# Patient Record
(demographics unavailable — no encounter records)

---

## 2024-10-17 NOTE — PLAN
[Cognitive and/or Behavior Therapy] : Cognitive and/or Behavior Therapy  [[facility]] : [facility]  [Supportive Therapy] : Supportive Therapy [Recommended Frequency of Visits: ____] : Recommended frequency of visits: [unfilled] [Return in ____ week(s)] : Return in [unfilled] week(s) [de-identified] : Patient reports periods of tearfulness and feelings of sadness about his aging and health process and about the future. He states that he does not want to end up in a nursing home like his mother did near the end. He admits to having some all or nothing thinking about his abilities and we worked on seeing the spectrum and focusing on the things he can do and the things that make him happy. He states that his granddaughter is a source of shin as she gets bigger and is more interactive. We spoke about memory issues and using some reminder methods like phone calendar and erase board. Encouraged him to continue to participate in some social activities like fire fighters and [email or web address]. We spoke about his reticence about getting hearing aid and how this may be affecting his desire to be in larger groups where hearing is harder. He continues to do some outdoor furniture making and reading. We spoke about using positive self-talk and reminding himself of things he can do and things he still enjoys.

## 2024-10-17 NOTE — PHYSICAL EXAM
[Cooperative] : cooperative [Depressed] : depressed [Anxious] : anxious [Full] : full [Clear] : clear [Linear/Goal Directed] : linear/goal directed [None] : none [None Reported] : none reported [Average] : average [WNL] : within normal limits

## 2024-10-22 NOTE — PHYSICAL EXAM
[None] : none [Cooperative] : cooperative [Anxious] : anxious [Flat] : flat [Clear] : clear [Linear/Goal Directed] : linear/goal directed [Memory] : memory [Average] : average [WNL] : within normal limits [FreeTextEntry8] : better [de-identified] : better [de-identified] : short term memory impairment.

## 2024-10-22 NOTE — PLAN
[FreeTextEntry4] : Assessment: Patient is a 73 yo male with h/o RICARDO, seen today for medication management. Patient is compliant with the medications, tolerating it well without any side effects. I-STOP was checked without any problems.   PLAN: Labs: July 2023: HBA1C 8.8 ALT: 5.0 CT HEAD WITHOUT CONTRAST 11/1/2023: NORMAL. Continue Wellbutrin  mg PO QAM for depression, low motivation, energy, concentration and decrease SSRI induced sexual dysfunction. Continue Zoloft 150 mg for PO for depression and anxiety - Discussed risks and benefits of medications including side effects of GI and sexual with SSRI. Alternative strategies including no intervention discussed with patient. Patient consents to current medications as prescribed. - Discussed with patient regarding importance of abstinence and sobriety from alcohol and drugs. Educated about relationship between worsening mood/anxiety symptoms and drug use and improvement of symptoms with abstinence. - Next appointment was made by the patient in 3 months Patient was not in any distress.

## 2024-10-22 NOTE — HISTORY OF PRESENT ILLNESS
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    HPI: Patient accompanied by his wife Mirta.  Patient's wife explains that Endy had an MI on September 2022. 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 Patient has been feeling depressed since the heart attack and denies any depression prior to the heart attack.   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 Since the MI patients also c/o feeling light headedness intermittently in in the past but now seemms to have it everyday. His cardiologist discontinued one of his cardiac.meds. He has decreased quality of life, fear of the unknown decreased short term memory. He forgot how to use the computer and as a result of not being able to do what he wants to do he feels frustrated and irritable. His PCP put him on Prozac 20 mg x 20 years. Does not know if it is working for him.  Currently on Prozac 20 mg  Depression: has low mood but denies any , anhedonia.  Anxiety: endorses to anxiety in the form of fear of the unknown.  Sleep: 6 to 7 hours per night  Appetite is good. Weight: 194 lbs. Ht: 6'1  Energy, concentration, and motivation are all decreased. Denies any AVH, SI or HI.  Hypomanic symptoms: denies  Substance use hx:  Alcohol: started at age 16. Highest amt was 1 case of beer. Currently drinks socially: 5-6 beers. Last sribk was last week where he had 1 glass of wine.  +h/o blackouts but denies any DWI or withdrawal symptoms from alcohol.   Marijuana: started at age 19. Highest ant was 2-3  joints per day. Last use was Nov 1990.  Cocaine: experiemted with it Caffine: tea 3 cups per day.    [FreeTextEntry2] : H/O: anxiety, and depressive  Inpatient hospitalization: denies  Past SI: denies  Therapist: denies.  Psychiatrist: coco. PCP was prescribing the meds  Medication trials: denies  Current medications: Prozac 20 mg.  Firearms: has 3 guns locked away.

## 2024-10-31 NOTE — PLAN
[Cognitive and/or Behavior Therapy] : Cognitive and/or Behavior Therapy  [[facility]] : [facility]  [Supportive Therapy] : Supportive Therapy [Recommended Frequency of Visits: ____] : Recommended frequency of visits: [unfilled] [Return in ____ week(s)] : Return in [unfilled] week(s) [de-identified] : Patient reports periods of tearfulness and feelings of sadness about his aging and health process and about the future. He states that this morning he had a hard time getting out of bed and felt foggy, but it got better during the session. He states that he felt good that he made the decision to tell his wife he was feeling poorly and asked her to drive him to session. He states that he has had trouble keeping up with exercises for dizziness and feels like he has been through so much rehab that he was tired of having to do these things. We spoke more about his tendency towards all or nothing thinking about his abilities and we worked more on seeing the spectrum and focusing on the things he can do and the things that make him happy. Encouraged him to continue to participate in some social activities like fire fighters and [employer]. We spoke about his reticence about getting hearing aid and how this may be affecting his desire to be in larger groups where hearing is harder. He continues to do some outdoor furniture making and reading. We spoke about using positive self-talk and reminding himself of things he can do and things he still enjoys.

## 2024-11-14 NOTE — PLAN
[Cognitive and/or Behavior Therapy] : Cognitive and/or Behavior Therapy  [[facility]] : [facility]  [Supportive Therapy] : Supportive Therapy [Recommended Frequency of Visits: ____] : Recommended frequency of visits: [unfilled] [Return in ____ week(s)] : Return in [unfilled] week(s) [de-identified] : Patient reports mood is better this week as is balance and head is less foggy. He states that PT is helping with balance and blood pressure medications have been adjusted. He states that he is feeling more optimistic about managing his illnesses and being able to stay active and productive. He spoke about work he has been doing on various projects and decisions he has been making about which social activities he has wanted to attend. He states that he is being more selective as there are times he wants to rest and some people that he feels closer to than others. He is doing a little better with trying not to use all or nothing thinking about his abilities and we worked more on seeing the spectrum and focusing on the things he can do and the things that make him happy. Encouraged him to continue to participate in some social activities like fire fighters and [employer]. He continues to do some outdoor furniture making and reading. We spoke about using positive self-talk and reminding himself of things he can do and things he still enjoys.

## 2024-11-14 NOTE — PHYSICAL EXAM
[Cooperative] : cooperative [Anxious] : anxious [Full] : full [Clear] : clear [Linear/Goal Directed] : linear/goal directed [None] : none [None Reported] : none reported [Average] : average [WNL] : within normal limits

## 2024-12-02 NOTE — PLAN
[Cognitive and/or Behavior Therapy] : Cognitive and/or Behavior Therapy  [[facility]] : [facility]  [Supportive Therapy] : Supportive Therapy [Recommended Frequency of Visits: ____] : Recommended frequency of visits: [unfilled] [Return in ____ week(s)] : Return in [unfilled] week(s) [de-identified] : Patient reports mood is better this week as is balance and head is less foggy. He states that PT is helping with balance and blood pressure medications have been adjusted. He states that he is feeling more optimistic about managing his illnesses and being able to stay active and productive. He spoke about work he has been doing on various projects and decisions he has been making about which social activities he has wanted to attend. He states that he is being more selective as there are times he wants to rest and some people that he feels closer to than others. He is doing a little better with trying not to use all or nothing thinking about his abilities and we worked more on seeing the spectrum and focusing on the things he can do and the things that make him happy. Encouraged him to continue to participate in some social activities like fire fighters and [employer]. He continues to do some outdoor furniture making and reading. We spoke about using positive self-talk and reminding himself of things he can do and things he still enjoys.

## 2024-12-13 NOTE — PLAN
[Cognitive and/or Behavior Therapy] : Cognitive and/or Behavior Therapy  [[facility]] : [facility]  [Supportive Therapy] : Supportive Therapy [Recommended Frequency of Visits: ____] : Recommended frequency of visits: [unfilled] [Return in ____ week(s)] : Return in [unfilled] week(s) [de-identified] : Patient reports mood has been stable with brief periods of unstable gait and fogginess. He states that he is getting tired of all his doctor appointments, and we spoke about spreading appointments farther apart and phasing them out if he stays well.  He states that PT is helping with balance and blood pressure medications have been adjusted. He states that he is feeling more optimistic about managing his illnesses and being able to stay active and productive. He continues to do work on various projects and attending selective social activities. He is doing better with trying not to use all or nothing thinking about his abilities and we worked more on seeing the spectrum and focusing on the things he can do and the things that make him happy. Encouraged him to continue to participate in some social activities like fire fighters and [employer]. He continues to do some outdoor furniture making and reading. We spoke about using positive self-talk and reminding himself of things he can do and things he still enjoys.

## 2025-01-06 NOTE — PHYSICAL EXAM
[None] : none [Cooperative] : cooperative [Anxious] : anxious [Flat] : flat [Clear] : clear [Linear/Goal Directed] : linear/goal directed [Memory] : memory [Average] : average [WNL] : within normal limits [FreeTextEntry8] : better [de-identified] : better [de-identified] : short term memory impairment.

## 2025-01-06 NOTE — HISTORY OF PRESENT ILLNESS
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    HPI: Patient accompanied by his wife Mirta.  Patient's wife explains that Endy had an MI on September 2022. 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 Patient has been feeling depressed since the heart attack and denies any depression prior to the heart attack.   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 Since the MI patients also c/o feeling light headedness intermittently in in the past but now seemms to have it everyday. His cardiologist discontinued one of his cardiac.meds. He has decreased quality of life, fear of the unknown decreased short term memory. He forgot how to use the computer and as a result of not being able to do what he wants to do he feels frustrated and irritable. His PCP put him on Prozac 20 mg x 20 years. Does not know if it is working for him.  Currently on Prozac 20 mg  Depression: has low mood but denies any , anhedonia.  Anxiety: endorses to anxiety in the form of fear of the unknown.  Sleep: 6 to 7 hours per night  Appetite is good. Weight: 194 lbs. Ht: 6'1  Energy, concentration, and motivation are all decreased. Denies any AVH, SI or HI.  Hypomanic symptoms: denies  Substance use hx:  Alcohol: started at age 16. Highest amt was 1 case of beer. Currently drinks socially: 5-6 beers. Last sribk was last week where he had 1 glass of wine.  +h/o blackouts but denies any DWI or withdrawal symptoms from alcohol.   Marijuana: started at age 19. Highest ant was 2-3  joints per day. Last use was Nov 1990.  Cocaine: experiemted with it Caffine: tea 3 cups per day.    [FreeTextEntry2] : H/O: anxiety, and depressive  Inpatient hospitalization: denies  Past SI: denies  Therapist: denies.  Psychiatrist: coco. PCP was prescribing the meds  Medication trials: denies  Current medications: Prozac 20 mg.  Firearms: has 3 guns locked away.

## 2025-04-07 NOTE — PHYSICAL EXAM
[None] : none [Cooperative] : cooperative [Anxious] : anxious [Flat] : flat [Clear] : clear [Linear/Goal Directed] : linear/goal directed [Memory] : memory [Average] : average [WNL] : within normal limits [FreeTextEntry8] : better [de-identified] : better [de-identified] : short term memory impairment.

## 2025-04-07 NOTE — HISTORY OF PRESENT ILLNESS
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    HPI: Patient accompanied by his wife Mirta.  Patient's wife explains that Endy had an MI on September 2022. 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 Patient has been feeling depressed since the heart attack and denies any depression prior to the heart attack.   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 Since the MI patients also c/o feeling light headedness intermittently in in the past but now seemms to have it everyday. His cardiologist discontinued one of his cardiac.meds. He has decreased quality of life, fear of the unknown decreased short term memory. He forgot how to use the computer and as a result of not being able to do what he wants to do he feels frustrated and irritable. His PCP put him on Prozac 20 mg x 20 years. Does not know if it is working for him.  Currently on Prozac 20 mg  Depression: has low mood but denies any , anhedonia.  Anxiety: endorses to anxiety in the form of fear of the unknown.  Sleep: 6 to 7 hours per night  Appetite is good. Weight: 194 lbs. Ht: 6'1  Energy, concentration, and motivation are all decreased. Denies any AVH, SI or HI.  Hypomanic symptoms: denies  Substance use hx:  Alcohol: started at age 16. Highest amt was 1 case of beer. Currently drinks socially: 5-6 beers. Last sribk was last week where he had 1 glass of wine.  +h/o blackouts but denies any DWI or withdrawal symptoms from alcohol.   Marijuana: started at age 19. Highest ant was 2-3  joints per day. Last use was Nov 1990.  Cocaine: experiemted with it Caffine: tea 3 cups per day.    [FreeTextEntry2] : H/O: anxiety, and depressive  Inpatient hospitalization: denies  Past SI: denies  Therapist: denies.  Psychiatrist: coco. PCP was prescribing the meds  Medication trials: denies  Current medications: Prozac 20 mg.  Firearms: has 3 guns locked away.

## 2025-04-07 NOTE — PLAN
[FreeTextEntry4] : Assessment: Patient is a 73 yo male with h/o RICARDO, seen today for medication management. Patient is compliant with the medications, tolerating it well without any side effects. I-STOP was checked without any problems.   PLAN: Labs: July 2023: HBA1C 8.8 ALT: 5.0 CT HEAD WITHOUT CONTRAST 11/1/2023: NORMAL. Continue Wellbutrin  mg PO QAM for depression, low motivation, energy, concentration and decrease SSRI induced sexual dysfunction. Increase Zoloft 150 to 200 mg for PO for depression and anxiety - Discussed risks and benefits of medications including side effects of GI and sexual with SSRI. Alternative strategies including no intervention discussed with patient. Patient consents to current medications as prescribed. - Discussed with patient regarding importance of abstinence and sobriety from alcohol and drugs. Educated about relationship between worsening mood/anxiety symptoms and drug use and improvement of symptoms with abstinence. - Next appointment was made by the patient in 2 months Patient was not in any distress.

## 2025-06-02 NOTE — PHYSICAL EXAM
[None] : none [Cooperative] : cooperative [Anxious] : anxious [Flat] : flat [Clear] : clear [Linear/Goal Directed] : linear/goal directed [Memory] : memory [Average] : average [WNL] : within normal limits [FreeTextEntry8] : better [de-identified] : better [de-identified] : short term memory impairment.

## 2025-06-02 NOTE — PLAN
[FreeTextEntry4] : Assessment: Patient is a 75 yo male with h/o RICARDO, seen today for medication management. Patient is compliant with the medications, tolerating it well without any side effects. I-STOP was checked without any problems.   PLAN: Labs: July 2023: HBA1C 8.8 ALT: 5.0 CT HEAD WITHOUT CONTRAST 11/1/2023: NORMAL. Increase Wellbutrin  to 300 mg PO QAM for depression, low motivation, energy, concentration and decrease SSRI induced sexual dysfunction. Continue Zoloft 150 mg for PO for depression and anxiety - Discussed risks and benefits of medications including side effects of GI and sexual with SSRI. Alternative strategies including no intervention discussed with patient. Patient consents to current medications as prescribed. - Discussed with patient regarding importance of abstinence and sobriety from alcohol and drugs. Educated about relationship between worsening mood/anxiety symptoms and drug use and improvement of symptoms with abstinence. - Next appointment was made by the patient in 2 months Patient was not in any distress.

## 2025-06-02 NOTE — HISTORY OF PRESENT ILLNESS
[FreeTextEntry1] : Patient is here in the office for face-to-face interview with writer for 2-month follow-up visit.  Patient accompanied by his wife Mirta. At that time Zoloft was increased from 150 to 200 mg. States 200 mg was too much and felt it was too stimulating. "I was too speedy." So, dose was decreased back to 150 mg.  As per Mirta and the patient, he has felt depressed. He cancelled a [employer] trip he was supposed to go. Wife was not coming as she does not enjoy that. States he says he will go to a meeting or go somewhere but the next day he backs out of it. Mirta says when he goes to the meetings he comes back saying "It was a good meeting." Patient had been complaining of tinnitus, cognitive impairment, hearing loss in both ears, and feeling lightheadedness. They are going to have an MRI brain done on June 16 and EEG was done and results are pending.   Mood: Has bouts of depression. No anxiety.  Sleep: 7-8 hours of sleep broken. Wakes up to go to the bathroom and sometimes hard to go back to sleep so he reads.  Appetite: is good.    Energy: is better   Concentration: better. Makes stupid mistakes Short term memory. "I am doing more things around the house.  Motivation: better Denies any AVH, SI or HI. Finished his therapy sessions with Land[facility]. He will return back to him if he needs to. States the therapy helped him.

## 2025-06-06 NOTE — PLAN
[Cognitive and/or Behavior Therapy] : Cognitive and/or Behavior Therapy  [[facility]] : [facility]  [Supportive Therapy] : Supportive Therapy [Recommended Frequency of Visits: ____] : Recommended frequency of visits: [unfilled] [Return in ____ week(s)] : Return in [unfilled] week(s) [de-identified] : Patient returning to therapy sessions after six months, He reports that he is still dealing with periods of depressed mood and lower motivation and tendency to isolate. He continues to report issues with memory with brief periods of unstable gait and fogginess as well as some hearing deficit. He still does some woodworking in his garage but gets frustrated at times and walks away from it. His wife has observed him getting more frustrated at home and more avoidant of social interactions. We spoke about his struggle with accepting his losses and adjusting to changes in what he can and cannot do. Worked with him on identifying these feelings and allowing them while not "piling on" or beating himself up and comparing himself to others who are in better shape. 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 as well as making time to see his children and grandchildren. He continues to do some outdoor furniture making and reading. We spoke about using positive self-talk and reminding himself of things he can do and things he still enjoys.

## 2025-06-23 NOTE — PLAN
[Cognitive and/or Behavior Therapy] : Cognitive and/or Behavior Therapy  [[facility]] : [facility]  [[facility]ive Therapy] : Supportive Therapy [Recommended Frequency of Visits: ____] : Recommended frequency of visits: [unfilled] [Return in ____ week(s)] : Return in [unfilled] week(s) [de-identified] : Patient states that he is doing better with less depressive thinking. He states that he is being kinder to himself and working on accepting his limitations and enjoying the things he can still do. 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 He continues to report some issues with memory with brief periods of unstable gait and fogginess as well as some hearing deficit. He still does some woodworking in his garage but gets frustrated at times and walks away from it. Encouraged him to continue to participate in some social activities like fire fighters and [employer] as well as making time to see his children and grandchildren. He continues to do some outdoor furniture making and reading. We spoke about using positive self-talk and reminding himself of things he can do and things he still enjoys.

## 2025-07-08 NOTE — PLAN
[Cognitive and/or Behavior Therapy] : Cognitive and/or Behavior Therapy  [[facility]] : [facility]  [[facility] Therapy] : Supportive Therapy [Recommended Frequency of Visits: ____] : Recommended frequency of visits: [unfilled] [Return in ____ week(s)] : Return in [unfilled] week(s) [de-identified] : Patient states that he is doing better with less depressive thinking. He states that he is being kinder to himself and working on accepting his limitations and enjoying the things he can still do. 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 He continues to report some issues with memory with brief periods of unstable gait and fogginess as well as some hearing deficit. He still does some woodworking in his garage but gets frustrated at times and walks away from it. Encouraged him to continue to participate in some social activities like fire fighters and [employer] as well as making time to see his children and grandchildren. He continues to do some outdoor furniture making and reading. We spoke about using positive self-talk and reminding himself of things he can do and things he still enjoys.

## 2025-07-24 NOTE — PLAN
[Cognitive and/or Behavior Therapy] : Cognitive and/or Behavior Therapy  [[facility]] : [facility]  [Supportive Therapy] : Supportive Therapy [Recommended Frequency of Visits: ____] : Recommended frequency of visits: [unfilled] [Return in ____ week(s)] : Return in [unfilled] week(s) [de-identified] : Patient states that he is still doing better with less depressive thinking. He states that he is being kinder to himself and working on accepting his limitations and enjoying the things he can still do. 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 He states that he had cancelled some trips and may try to un-cancel them. He continues to report some issues with memory with brief periods of unstable gait and fogginess as well as some hearing deficit. He still does some woodworking in his garage and donates his work to various places. 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 He continues to do some outdoor furniture making and reading. We spoke about using positive self-talk and reminding himself of things he can do and things he still enjoys.